# Patient Record
Sex: MALE | Race: WHITE
[De-identification: names, ages, dates, MRNs, and addresses within clinical notes are randomized per-mention and may not be internally consistent; named-entity substitution may affect disease eponyms.]

---

## 2018-09-30 ENCOUNTER — HOSPITAL ENCOUNTER (EMERGENCY)
Dept: HOSPITAL 41 - JD.ED | Age: 23
LOS: 1 days | Discharge: HOME | End: 2018-10-01
Payer: COMMERCIAL

## 2018-09-30 DIAGNOSIS — R07.9: Primary | ICD-10-CM

## 2018-09-30 PROCEDURE — 93005 ELECTROCARDIOGRAM TRACING: CPT

## 2018-09-30 PROCEDURE — 96375 TX/PRO/DX INJ NEW DRUG ADDON: CPT

## 2018-09-30 PROCEDURE — 71046 X-RAY EXAM CHEST 2 VIEWS: CPT

## 2018-09-30 PROCEDURE — 85379 FIBRIN DEGRADATION QUANT: CPT

## 2018-09-30 PROCEDURE — 85027 COMPLETE CBC AUTOMATED: CPT

## 2018-09-30 PROCEDURE — 99285 EMERGENCY DEPT VISIT HI MDM: CPT

## 2018-09-30 PROCEDURE — 80053 COMPREHEN METABOLIC PANEL: CPT

## 2018-09-30 PROCEDURE — 96374 THER/PROPH/DIAG INJ IV PUSH: CPT

## 2018-09-30 PROCEDURE — 36415 COLL VENOUS BLD VENIPUNCTURE: CPT

## 2018-09-30 PROCEDURE — 85007 BL SMEAR W/DIFF WBC COUNT: CPT

## 2018-09-30 PROCEDURE — 84484 ASSAY OF TROPONIN QUANT: CPT

## 2018-09-30 PROCEDURE — 83690 ASSAY OF LIPASE: CPT

## 2018-09-30 NOTE — EDM.PDOC
ED HPI GENERAL MEDICAL PROBLEM





- General


Chief Complaint: Chest Pain


Stated Complaint: CHEST PRESSURE AND PAIN ARM NUMBNESS


Time Seen by Provider: 09/30/18 21:41


Source of Information: Reports: Patient


History Limitations: Reports: No Limitations





- History of Present Illness


INITIAL COMMENTS - FREE TEXT/NARRATIVE: 





22-year-old male presents for evaluation and treatment of chest pain. Patient 

reports the chest pain started around 12:30 AM this morning. He states he was 

laying in bed at the time. Not doing anything strenuous. Reports associated 

symptoms of shortness of breath. He states that he did feel nauseous. He denies 

any vomiting. Denies any recent fevers, chills, cough or cold symptoms. No 

lightheadedness or dizziness. No syncope. Reports pain into the right arm. He 

received chest pain is primarily located on the right side of his anterior 

chest and substernal. No radiation to his tach. He's never had anything like 

this before. States nothing like certain positions, foods or movements seem to 

make the pain better or worse. 


  ** Left Chest


Pain Score (Numeric/FACES): 7





- Related Data


 Allergies











Allergy/AdvReac Type Severity Reaction Status Date / Time


 


No Known Allergies Allergy   Verified 09/30/18 21:13











Home Meds: 


 Home Meds





Acetaminophen [Tylenol Extra Strength] 1,000 mg PO ONCALL PRN 09/30/18 [History]











Past Medical History





- Past Health History


Medical/Surgical History: Denies Medical/Surgical History





Social & Family History





- Tobacco Use


Smoking Status *Q: Never Smoker





- Alcohol Use


Days Per Week of Alcohol Use: 2


Number of Drinks Per Day: 3


Total Drinks Per Week: 6





- Recreational Drug Use


Recreational Drug Use: No





ED ROS GENERAL





- Review of Systems


Review Of Systems: See Below


Constitutional: Denies: Fever, Chills


Respiratory: Reports: Shortness of Breath, Pleuritic Chest Pain.  Denies: Cough


Cardiovascular: Reports: Chest Pain (right anterior and substernal).  Denies: 

Lightheadedness, Syncope


GI/Abdominal: Denies: Abdominal Pain, Nausea, Vomiting


Musculoskeletal: Reports: Arm Pain (right).  Denies: Neck Pain, Back Pain


Neurological: Denies: Dizziness, Syncope





ED EXAM, GENERAL





- Physical Exam


Exam: See Below


Exam Limited By: No Limitations


General Appearance: Alert, WD/WN, No Apparent Distress, Thin


Eye Exam: Bilateral Eye: Normal Inspection


Ears: Normal External Exam


Nose: Normal Inspection


Throat/Mouth: Normal Inspection, Normal Lips, Normal Voice, No Airway Compromise


Neck: Normal Inspection


Respiratory/Chest: No Respiratory Distress, Lungs Clear, Normal Breath Sounds, 

Chest Non-Tender


Cardiovascular: Normal Peripheral Pulses, Regular Rate, Rhythm, No Murmur


GI/Abdominal: Soft, Non-Tender


Neurological: Alert, Oriented, Normal Cognition


Psychiatric: Normal Affect, Normal Mood


Skin Exam: Warm, Dry, Normal Color





EKG INTERPRETATION


EKG Date: 09/30/18


Time: 21:57


Rhythm: NSR


Rate (Beats/Min): 87


Axis: Normal


P-Wave: Present


QRS: Normal


ST-T: Normal


QT: Normal


EKG Interpretation Comments: 





Normal sinus rhythm at 87 bpm. Normal ventricular hypertrophy pattern - normal 

for age. Mild right axis deviation (92 degrees). Reviewed by myself and Dr. Wilcox. 





Course





- Vital Signs


Last Recorded V/S: 


 Last Vital Signs











Temp  97.6 F   09/30/18 21:15


 


Pulse  92   09/30/18 21:15


 


Resp  21 H  09/30/18 21:15


 


BP      


 


Pulse Ox  97   09/30/18 21:15














- Orders/Labs/Meds


Orders: 


 Active Orders 24 hr











 Category Date Time Status


 


 Cardiac Monitoring [RC] .AS DIRECTED Care  09/30/18 21:48 Active


 


 EKG Documentation Completion [RC] ASDIRECTED Care  09/30/18 21:48 Active


 


 Peripheral IV Care [RC] .AS DIRECTED Care  09/30/18 21:48 Active


 


 Chest 2V [CR] Stat Exams  09/30/18 21:48 Taken


 


 Peripheral IV Insertion Adult [OM.PC] Routine Oth  09/30/18 21:48 Ordered


 


 EKG 12 Lead [EK] Stat Ther  09/30/18 21:48 Ordered











Labs: 


 Laboratory Tests











  09/30/18 09/30/18 09/30/18 Range/Units





  21:35 21:55 21:55 


 


WBC   6.73   (4.23-9.07)  K/mm3


 


RBC   4.97   (4.63-6.08)  M/mm3


 


Hgb   15.1   (13.7-17.5)  gm/L


 


Hct   43.3   (40.1-51.0)  %


 


MCV   87.1   (79.0-92.2)  fl


 


MCH   30.4   (25.7-32.2)  pg


 


MCHC   34.9   (32.2-35.5)  g/dl


 


RDW Std Deviation   38.1   (35.1-43.9)  fL


 


Plt Count   164   (163-337)  K/mm3


 


MPV   9.6   (9.4-12.3)  fl


 


Neutrophils % (Manual)   78 H   (40-60)  %


 


Band Neutrophils %   0   (0-10)  %


 


Lymphocytes % (Manual)   9 L   (20-40)  %


 


Atypical Lymphs %   0   %


 


Monocytes % (Manual)   11 H   (2-10)  %


 


Eosinophils % (Manual)   1   (0.8-7.0)  %


 


Basophils % (Manual)   1   (0.2-1.2)  


 


Platelet Estimate   Adequate   


 


Plt Morphology Comment   Normal   


 


RBC Morph Comment   Normal   


 


D-Dimer, Quantitative  0.28    (0.19-0.50)  mg/L


 


Sodium    142  (136-145)  mEq/L


 


Potassium    3.4 L  (3.5-5.1)  mEq/L


 


Chloride    106  ()  mEq/L


 


Carbon Dioxide    27  (21-32)  mEq/L


 


Anion Gap    12.4  (5-15)  


 


BUN    15  (7-18)  mg/dL


 


Creatinine    1.0  (0.7-1.3)  mg/dL


 


Est Cr Clr Drug Dosing    132.43  mL/min


 


Estimated GFR (MDRD)    > 60  (>60)  mL/min


 


BUN/Creatinine Ratio    15.0  (14-18)  


 


Glucose    112 H  ()  mg/dL


 


Calcium    8.7  (8.5-10.1)  mg/dL


 


Total Bilirubin    0.3  (0.2-1.0)  mg/dL


 


AST    15  (15-37)  U/L


 


ALT    15 L  (16-63)  U/L


 


Alkaline Phosphatase    59  ()  U/L


 


Troponin I    < 0.017  (0.00-0.056)  ng/mL


 


Total Protein    6.9  (6.4-8.2)  g/dl


 


Albumin    3.5  (3.4-5.0)  g/dl


 


Globulin    3.4  gm/dL


 


Albumin/Globulin Ratio    1.0  (1-2)  


 


Lipase     ()  U/L














  09/30/18 Range/Units





  21:55 


 


WBC   (4.23-9.07)  K/mm3


 


RBC   (4.63-6.08)  M/mm3


 


Hgb   (13.7-17.5)  gm/L


 


Hct   (40.1-51.0)  %


 


MCV   (79.0-92.2)  fl


 


MCH   (25.7-32.2)  pg


 


MCHC   (32.2-35.5)  g/dl


 


RDW Std Deviation   (35.1-43.9)  fL


 


Plt Count   (163-337)  K/mm3


 


MPV   (9.4-12.3)  fl


 


Neutrophils % (Manual)   (40-60)  %


 


Band Neutrophils %   (0-10)  %


 


Lymphocytes % (Manual)   (20-40)  %


 


Atypical Lymphs %   %


 


Monocytes % (Manual)   (2-10)  %


 


Eosinophils % (Manual)   (0.8-7.0)  %


 


Basophils % (Manual)   (0.2-1.2)  


 


Platelet Estimate   


 


Plt Morphology Comment   


 


RBC Morph Comment   


 


D-Dimer, Quantitative   (0.19-0.50)  mg/L


 


Sodium   (136-145)  mEq/L


 


Potassium   (3.5-5.1)  mEq/L


 


Chloride   ()  mEq/L


 


Carbon Dioxide   (21-32)  mEq/L


 


Anion Gap   (5-15)  


 


BUN   (7-18)  mg/dL


 


Creatinine   (0.7-1.3)  mg/dL


 


Est Cr Clr Drug Dosing   mL/min


 


Estimated GFR (MDRD)   (>60)  mL/min


 


BUN/Creatinine Ratio   (14-18)  


 


Glucose   ()  mg/dL


 


Calcium   (8.5-10.1)  mg/dL


 


Total Bilirubin   (0.2-1.0)  mg/dL


 


AST   (15-37)  U/L


 


ALT   (16-63)  U/L


 


Alkaline Phosphatase   ()  U/L


 


Troponin I   (0.00-0.056)  ng/mL


 


Total Protein   (6.4-8.2)  g/dl


 


Albumin   (3.4-5.0)  g/dl


 


Globulin   gm/dL


 


Albumin/Globulin Ratio   (1-2)  


 


Lipase  114  ()  U/L











Meds: 


Medications














Discontinued Medications














Generic Name Dose Route Start Last Admin





  Trade Name Freq  PRN Reason Stop Dose Admin


 


Hydromorphone HCl  0.5 mg  09/30/18 21:48  09/30/18 21:59





  Dilaudid  IVPUSH  09/30/18 21:49  0.5 mg





  ONETIME ONE   Administration





     





     





     





     


 


Ondansetron HCl  4 mg  09/30/18 21:48  09/30/18 21:58





  Zofran  IVPUSH  09/30/18 21:49  4 mg





  ONETIME ONE   Administration





     





     





     





     


 


Sodium Chloride  10 ml  09/30/18 21:48  09/30/18 22:00





  Saline Flush  FLUSH   10 ml





  ASDIRECTED PRN   Administration





  Keep Vein Open   





     





     





     














- Radiology Interpretation


Free Text/Narrative:: 





Two-view chest x-ray shows no acute intrathoracic process. Reviewed by myself 

and Dr. iWlcox. 





- Re-Assessments/Exams


Free Text/Narrative Re-Assessment/Exam: 





10/01/18 00:21


I discussed the labs, EKG and imaging with the patient. Offered additional 

medication for pain as he reports no pain and improvement with the Dilaudid. He 

declines this and would like to go home given his test are negative.





Will discharge home at this time.  Recommend follow-up in the clinic within 2 

weeks.





Discharge instructions as documented.








Departure





- Departure


Time of Disposition: 00:21


Disposition: Home, Self-Care 01


Condition: Fair


Clinical Impression: 


 Chest pain of uncertain etiology





Instructions:  Nonspecific Chest Pain


Referrals: 


PCP,None [Primary Care Provider] - 


Musa Robledo PA [Physician Assistant] - 


Forms:  ED Department Discharge


Additional Instructions: 


Go home and rest.





Over-the-counter Tylenol or Motrin as needed pain relief.





Follow-up with family medicine within 2 weeks for recheck of your symptoms. 

Recommend Musa Robledo or Molly Barrett at the [facility]. Call [phone] schedule with one of these providers.





Please return to the ER if your symptoms change or worsen. 





- My Orders


Last 24 Hours: 


My Active Orders





09/30/18 21:48


Cardiac Monitoring [RC] .AS DIRECTED 


EKG Documentation Completion [RC] ASDIRECTED 


Peripheral IV Care [RC] .AS DIRECTED 


Chest 2V [CR] Stat 


Peripheral IV Insertion Adult [OM.PC] Routine 


EKG 12 Lead [EK] Stat 














- Assessment/Plan


Last 24 Hours: 


My Active Orders





09/30/18 21:48


Cardiac Monitoring [RC] .AS DIRECTED 


EKG Documentation Completion [RC] ASDIRECTED 


Peripheral IV Care [RC] .AS DIRECTED 


Chest 2V [CR] Stat 


Peripheral IV Insertion Adult [OM.PC] Routine 


EKG 12 Lead [EK] Stat

## 2018-10-01 NOTE — CR
Chest:  Two views of the chest were obtained.

 

Comparison: No prior chest x-ray.

 

Heart size and mediastinum are within normal limits.  Lungs are clear.

  Bony structures are unremarkable.

 

Impression:

1.  Nothing acute is seen on two-view chest x-ray.

 

Diagnostic code #1